# Patient Record
Sex: MALE | Race: WHITE | NOT HISPANIC OR LATINO | URBAN - METROPOLITAN AREA
[De-identification: names, ages, dates, MRNs, and addresses within clinical notes are randomized per-mention and may not be internally consistent; named-entity substitution may affect disease eponyms.]

---

## 2023-01-01 ENCOUNTER — HOSPITAL ENCOUNTER (EMERGENCY)
Facility: HOSPITAL | Age: 38
End: 2023-10-25
Attending: EMERGENCY MEDICINE
Payer: COMMERCIAL

## 2023-01-01 DIAGNOSIS — S01.93XA GUNSHOT WOUND OF HEAD, INITIAL ENCOUNTER: Primary | ICD-10-CM

## 2023-01-01 PROCEDURE — 99291 CRITICAL CARE FIRST HOUR: CPT | Performed by: EMERGENCY MEDICINE

## 2023-01-01 PROCEDURE — 99285 EMERGENCY DEPT VISIT HI MDM: CPT | Mod: 25

## 2023-01-01 PROCEDURE — G0390 TRAUMA RESPONS W/HOSP CRITI: HCPCS

## 2023-01-01 PROCEDURE — 99292 CRITICAL CARE ADDL 30 MIN: CPT | Performed by: EMERGENCY MEDICINE

## 2023-01-01 ASSESSMENT — PAIN - FUNCTIONAL ASSESSMENT
PAIN_FUNCTIONAL_ASSESSMENT: UNABLE TO SELF-REPORT
PAIN_FUNCTIONAL_ASSESSMENT: UNABLE TO SELF-REPORT

## 2023-10-25 VITALS
BODY MASS INDEX: 22.56 KG/M2 | DIASTOLIC BLOOD PRESSURE: 69 MMHG | HEIGHT: 69 IN | WEIGHT: 152.34 LBS | HEART RATE: 86 BPM | TEMPERATURE: 98.2 F | OXYGEN SATURATION: 96 % | RESPIRATION RATE: 28 BRPM | SYSTOLIC BLOOD PRESSURE: 83 MMHG
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
## 2023-10-25 NOTE — ED PROCEDURE NOTE
Procedure  Critical Care    Performed by: Stephanie Cavanaugh MD  Authorized by: Stephanie Cavanaugh MD    Critical care provider statement:     Critical care time (minutes):  31    Critical care start time:  10/24/2023 10:00 PM    Critical care end time:  10/24/2023 10:31 PM    Critical care was necessary to treat or prevent imminent or life-threatening deterioration of the following conditions:  Trauma    Critical care was time spent personally by me on the following activities:  Discussions with consultants, discussions with primary provider, examination of patient, obtaining history from patient or surrogate, review of old charts, re-evaluation of patient's condition and pulse oximetry    I assumed direction of critical care for this patient from another provider in my specialty: yes               Stephanie Cavanaugh MD  10/24/23 3434
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
